# Patient Record
Sex: MALE | Race: WHITE | NOT HISPANIC OR LATINO | Employment: FULL TIME | URBAN - METROPOLITAN AREA
[De-identification: names, ages, dates, MRNs, and addresses within clinical notes are randomized per-mention and may not be internally consistent; named-entity substitution may affect disease eponyms.]

---

## 2019-08-09 ENCOUNTER — OFFICE VISIT (OUTPATIENT)
Dept: FAMILY MEDICINE CLINIC | Facility: CLINIC | Age: 29
End: 2019-08-09

## 2019-08-09 VITALS
WEIGHT: 130.4 LBS | HEIGHT: 71 IN | DIASTOLIC BLOOD PRESSURE: 86 MMHG | HEART RATE: 63 BPM | OXYGEN SATURATION: 99 % | BODY MASS INDEX: 18.26 KG/M2 | SYSTOLIC BLOOD PRESSURE: 118 MMHG

## 2019-08-09 DIAGNOSIS — G43.709 CHRONIC MIGRAINE WITHOUT AURA WITHOUT STATUS MIGRAINOSUS, NOT INTRACTABLE: ICD-10-CM

## 2019-08-09 DIAGNOSIS — D17.0 LIPOMA OF NECK: Primary | ICD-10-CM

## 2019-08-09 PROCEDURE — 99203 OFFICE O/P NEW LOW 30 MIN: CPT | Performed by: FAMILY MEDICINE

## 2019-08-09 RX ORDER — SUMATRIPTAN 25 MG/1
TABLET, FILM COATED ORAL
Qty: 10 TABLET | Refills: 0 | Status: SHIPPED | OUTPATIENT
Start: 2019-08-09 | End: 2020-02-06 | Stop reason: SDUPTHER

## 2019-08-14 NOTE — PROGRESS NOTES
Subjective   Brijesh Ferrera is a 28 y.o. male.     Chief Complaint   Patient presents with   • Establish Care     new pt,    • Abscess     behind left ear        History of Present Illness     Brijesh Ferrera presents today for   Chief Complaint   Patient presents with   • Establish Care     new pt,    • Abscess     behind left ear      He reports that he has what appears to be an abscess behind his left ear.  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    He has a history of chronic migraines, takes Advil as needed.  These occur a few times per month, when they do occur they are severely debilitating.  If he does not catch them early he is typically down for the day.    This patient is accompanied by their self who contributes to the history of their care.    The following portions of the patient's history were reviewed and updated as appropriate: allergies, current medications, past family history, past medical history, past social history, past surgical history and problem list.    Active Ambulatory Problems     Diagnosis Date Noted   • No Active Ambulatory Problems     Resolved Ambulatory Problems     Diagnosis Date Noted   • No Resolved Ambulatory Problems     Past Medical History:   Diagnosis Date   • Migraine      Past Surgical History:   Procedure Laterality Date   • CATARACT EXTRACTION WITH INTRAOCULAR LENS IMPLANT Right    • CRYOTHERAPY       Family History   Problem Relation Age of Onset   • Diabetes Father      Social History     Socioeconomic History   • Marital status:      Spouse name: Not on file   • Number of children: Not on file   • Years of education: Not on file   • Highest education level: Not on file   Tobacco Use   • Smoking status: Current Every Day Smoker     Types: Electronic Cigarette   • Smokeless tobacco: Never  "Used   Substance and Sexual Activity   • Alcohol use: Yes     Comment: rarely    • Drug use: Yes     Types: Marijuana   • Sexual activity: Yes     Partners: Female     Birth control/protection: None         Review of Systems   Constitutional: Negative.    Eyes: Negative.    Respiratory: Negative for cough, chest tightness, shortness of breath and wheezing.    Cardiovascular: Negative for chest pain and palpitations.   Gastrointestinal: Negative for abdominal distention, abdominal pain, constipation, diarrhea, nausea and vomiting.   Musculoskeletal: Negative for gait problem and joint swelling.   Skin: Negative for color change and wound.   Neurological: Positive for headaches.   Psychiatric/Behavioral: Negative for agitation and hallucinations.       Objective   Blood pressure 118/86, pulse 63, height 180.3 cm (71\"), weight 59.1 kg (130 lb 6.4 oz), SpO2 99 %.  Nursing note reviewed  Physical Exam  Const: NAD, A&Ox4, Pleasant, Cooperative  Eyes: EOMI, no conjunctivitis  ENT: No nasal discharge present, neck supple  Cardiac: Regular rate and rhythm, no cyanosis  Resp: Respiratory rate within normal limits, no increased work of breathing, no audible wheezing or retractions noted  GI: No distention or ascites  MSK: Motor and sensation grossly intact in bilateral upper extremities  Neurologic: CN II-XII grossly intact  Psych: Appropriate mood and behavior.  Skin: There is a large purple papule about 1.9 cm in diameter behind his left ear over the mastoid process  Procedures  Assessment/Plan     Problem List Items Addressed This Visit     None      Visit Diagnoses     Lipoma of neck    -  Primary    Ref to derm- left mastoid process    Relevant Orders    Ambulatory Referral to Dermatology    Chronic migraine without aura without status migrainosus, not intractable        Relevant Medications    SUMAtriptan (IMITREX) 25 MG tablet      #1 lesion of skin on left mastoid process  Unsure exactly what this may be, the " discoloration would suggest that it is not a lipoma.  It does not appear to have any umbilication or fluctuance more consistent with an abscess.  I would like him to see a dermatologist to have this biopsied    #2 chronic migraine  I like him to use Imitrex as needed at the onset of headache    There are no Patient Instructions on file for this visit.    No Follow-up on file.    Ambulatory progress note signed and attested to by Abundio Shanks D.O.

## 2020-02-06 ENCOUNTER — OFFICE VISIT (OUTPATIENT)
Dept: FAMILY MEDICINE CLINIC | Facility: CLINIC | Age: 30
End: 2020-02-06

## 2020-02-06 ENCOUNTER — LAB (OUTPATIENT)
Dept: LAB | Facility: HOSPITAL | Age: 30
End: 2020-02-06

## 2020-02-06 VITALS
OXYGEN SATURATION: 98 % | DIASTOLIC BLOOD PRESSURE: 70 MMHG | WEIGHT: 129 LBS | HEART RATE: 83 BPM | HEIGHT: 71 IN | BODY MASS INDEX: 18.06 KG/M2 | SYSTOLIC BLOOD PRESSURE: 120 MMHG

## 2020-02-06 DIAGNOSIS — G43.709 CHRONIC MIGRAINE WITHOUT AURA WITHOUT STATUS MIGRAINOSUS, NOT INTRACTABLE: ICD-10-CM

## 2020-02-06 DIAGNOSIS — R19.5 LOOSE STOOLS: ICD-10-CM

## 2020-02-06 DIAGNOSIS — R40.0 DAYTIME SOMNOLENCE: ICD-10-CM

## 2020-02-06 DIAGNOSIS — R51.9 HEADACHE CAUSING FREQUENT AWAKENING FROM SLEEP: ICD-10-CM

## 2020-02-06 DIAGNOSIS — R06.83 SNORING: ICD-10-CM

## 2020-02-06 DIAGNOSIS — G43.709 CHRONIC MIGRAINE WITHOUT AURA WITHOUT STATUS MIGRAINOSUS, NOT INTRACTABLE: Primary | ICD-10-CM

## 2020-02-06 DIAGNOSIS — R41.840 ATTENTION DEFICIT: ICD-10-CM

## 2020-02-06 DIAGNOSIS — Z01.00 ROUTINE EYE EXAM: ICD-10-CM

## 2020-02-06 LAB
ALBUMIN SERPL-MCNC: 4.6 G/DL (ref 3.5–5.2)
ALBUMIN/GLOB SERPL: 2 G/DL
ALP SERPL-CCNC: 98 U/L (ref 39–117)
ALT SERPL W P-5'-P-CCNC: 11 U/L (ref 1–41)
ANION GAP SERPL CALCULATED.3IONS-SCNC: 11 MMOL/L (ref 5–15)
AST SERPL-CCNC: 14 U/L (ref 1–40)
BASOPHILS # BLD AUTO: 0.1 10*3/MM3 (ref 0–0.2)
BASOPHILS NFR BLD AUTO: 1 % (ref 0–1.5)
BILIRUB SERPL-MCNC: 0.2 MG/DL (ref 0.2–1.2)
BUN BLD-MCNC: 12 MG/DL (ref 6–20)
BUN/CREAT SERPL: 12.9 (ref 7–25)
CALCIUM SPEC-SCNC: 9.4 MG/DL (ref 8.6–10.5)
CHLORIDE SERPL-SCNC: 101 MMOL/L (ref 98–107)
CO2 SERPL-SCNC: 26 MMOL/L (ref 22–29)
CREAT BLD-MCNC: 0.93 MG/DL (ref 0.76–1.27)
DEPRECATED RDW RBC AUTO: 40.3 FL (ref 37–54)
EOSINOPHIL # BLD AUTO: 0.76 10*3/MM3 (ref 0–0.4)
EOSINOPHIL NFR BLD AUTO: 7.7 % (ref 0.3–6.2)
ERYTHROCYTE [DISTWIDTH] IN BLOOD BY AUTOMATED COUNT: 12 % (ref 12.3–15.4)
ERYTHROCYTE [SEDIMENTATION RATE] IN BLOOD: 3 MM/HR (ref 0–15)
GFR SERPL CREATININE-BSD FRML MDRD: 96 ML/MIN/1.73
GLOBULIN UR ELPH-MCNC: 2.3 GM/DL
GLUCOSE BLD-MCNC: 95 MG/DL (ref 65–99)
HCT VFR BLD AUTO: 43.6 % (ref 37.5–51)
HGB BLD-MCNC: 15.1 G/DL (ref 13–17.7)
IMM GRANULOCYTES # BLD AUTO: 0.04 10*3/MM3 (ref 0–0.05)
IMM GRANULOCYTES NFR BLD AUTO: 0.4 % (ref 0–0.5)
LYMPHOCYTES # BLD AUTO: 2.14 10*3/MM3 (ref 0.7–3.1)
LYMPHOCYTES NFR BLD AUTO: 21.6 % (ref 19.6–45.3)
MCH RBC QN AUTO: 32.2 PG (ref 26.6–33)
MCHC RBC AUTO-ENTMCNC: 34.6 G/DL (ref 31.5–35.7)
MCV RBC AUTO: 93 FL (ref 79–97)
MONOCYTES # BLD AUTO: 0.85 10*3/MM3 (ref 0.1–0.9)
MONOCYTES NFR BLD AUTO: 8.6 % (ref 5–12)
NEUTROPHILS # BLD AUTO: 6.01 10*3/MM3 (ref 1.7–7)
NEUTROPHILS NFR BLD AUTO: 60.7 % (ref 42.7–76)
NRBC BLD AUTO-RTO: 0 /100 WBC (ref 0–0.2)
PLATELET # BLD AUTO: 327 10*3/MM3 (ref 140–450)
PMV BLD AUTO: 9.5 FL (ref 6–12)
POTASSIUM BLD-SCNC: 4.6 MMOL/L (ref 3.5–5.2)
PROT SERPL-MCNC: 6.9 G/DL (ref 6–8.5)
RBC # BLD AUTO: 4.69 10*6/MM3 (ref 4.14–5.8)
SODIUM BLD-SCNC: 138 MMOL/L (ref 136–145)
TSH SERPL DL<=0.05 MIU/L-ACNC: 1.05 UIU/ML (ref 0.27–4.2)
WBC NRBC COR # BLD: 9.9 10*3/MM3 (ref 3.4–10.8)

## 2020-02-06 PROCEDURE — 85652 RBC SED RATE AUTOMATED: CPT

## 2020-02-06 PROCEDURE — 84443 ASSAY THYROID STIM HORMONE: CPT

## 2020-02-06 PROCEDURE — 99215 OFFICE O/P EST HI 40 MIN: CPT | Performed by: FAMILY MEDICINE

## 2020-02-06 PROCEDURE — 80053 COMPREHEN METABOLIC PANEL: CPT

## 2020-02-06 PROCEDURE — 85025 COMPLETE CBC W/AUTO DIFF WBC: CPT

## 2020-02-06 RX ORDER — SUMATRIPTAN 25 MG/1
TABLET, FILM COATED ORAL
Qty: 20 TABLET | Refills: 0 | Status: SHIPPED | OUTPATIENT
Start: 2020-02-06

## 2020-02-06 RX ORDER — SUMATRIPTAN 25 MG/1
TABLET, FILM COATED ORAL
Qty: 20 TABLET | Refills: 0 | Status: SHIPPED | OUTPATIENT
Start: 2020-02-06 | End: 2020-02-06 | Stop reason: SDUPTHER

## 2020-02-06 NOTE — PROGRESS NOTES
"Subjective   Brijesh Ferrera is a 29 y.o. male.     Chief Complaint   Patient presents with   • Follow-up     migraines, loose stools ongoing for 6 months        History of Present Illness     Brijesh Ferrera presents today for   Chief Complaint   Patient presents with   • Follow-up     migraines, loose stools ongoing for 6 months      Loose stools.  Described as between a type V and VI on the [place] stool scale.  Denies any mucus or blood, denies oily stools or particularly foul-smelling stool.  He has some abdominal cramping but this is usually more in the mornings and not associated temporally with the diarrhea.  Duration- 6 months  Diet- eats out a lot, admits he does not follow a particularly healthy diet, he has always had fairly low weight and difficulty gaining weight, so he does not really pay much attention to \"healthy\" foods.  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  Family History- none  Pattern- daily  Timing- happens about 30 minutes after eating anything  Travel- none  Currently vape & marijuana (the latter was an attempt to try to increase his appetite)    He has a history of chronic migraines, takes Advil as needed.  These occur a few times per month, when they do occur they are severely debilitating.  If he does not catch them early he is typically down for the day.  These have been occurring for a number of years since his early 20s, they have been becoming more frequent.  He took 10 Imitrex over the course of maybe a month after he was seen last fall.  Since then he has just struggled with them.  They are becoming more of a disturbance, coming 3-4 times per week if not daily.  Most often they occur at night, awaken him from sleep.  He does snore often and loudly per his wife.  He has had daytime somnolence for as long as he can remember, struggled with ADHD type symptoms when he was younger but has just kind of dealt with them.    This patient is accompanied by their self who contributes to the history of their " care.    The following portions of the patient's history were reviewed and updated as appropriate: allergies, current medications, past family history, past medical history, past social history, past surgical history and problem list.    Active Ambulatory Problems     Diagnosis Date Noted   • No Active Ambulatory Problems     Resolved Ambulatory Problems     Diagnosis Date Noted   • No Resolved Ambulatory Problems     Past Medical History:   Diagnosis Date   • Migraine      Past Surgical History:   Procedure Laterality Date   • CATARACT EXTRACTION WITH INTRAOCULAR LENS IMPLANT Right    • CRYOTHERAPY       Family History   Problem Relation Age of Onset   • Diabetes Father      Social History     Socioeconomic History   • Marital status:      Spouse name: Not on file   • Number of children: Not on file   • Years of education: Not on file   • Highest education level: Not on file   Tobacco Use   • Smoking status: Current Every Day Smoker     Types: Electronic Cigarette   • Smokeless tobacco: Never Used   Substance and Sexual Activity   • Alcohol use: Yes     Comment: rarely    • Drug use: Yes     Types: Marijuana   • Sexual activity: Yes     Partners: Female     Birth control/protection: None         Review of Systems   Constitutional: Negative.    Eyes: Negative.    Respiratory: Negative for cough, chest tightness, shortness of breath and wheezing.    Cardiovascular: Negative for chest pain and palpitations.   Gastrointestinal: Positive for abdominal pain, diarrhea and nausea. Negative for abdominal distention, blood in stool, constipation and vomiting.   Endocrine: Negative.    Genitourinary: Negative.    Musculoskeletal: Negative for gait problem and joint swelling.   Skin: Negative for color change and wound.   Neurological: Positive for headaches.   Hematological: Negative.    Psychiatric/Behavioral: Negative for agitation and hallucinations.       Objective   Blood pressure 120/70, pulse 83, height 180.3 cm  "(70.98\"), weight 58.5 kg (129 lb), SpO2 98 %.  Nursing note reviewed  Physical Exam  Const: NAD, A&Ox4, Pleasant, Cooperative  Eyes: EOMI, no conjunctivitis  ENT: No nasal discharge present, neck supple  Cardiac: Regular rate and rhythm, no cyanosis  Resp: Respiratory rate within normal limits, no increased work of breathing, no audible wheezing or retractions noted  GI: No distention or ascites, BS + normoactive  MSK: Motor and sensation grossly intact in bilateral upper extremities  Neurologic examination:  Mental status:  The patient is alert, attentive, and oriented. Speech is clear and fluent with good repetition, comprehension, and naming. She recalls 3/3 objects at 5 minutes.    Cranial nerves:  CN II: Visual fields are full to confrontation. Fundoscopic exam is normal with sharp discs and no vascular changes. Pupils are 4 mm and briskly reactive to light. Visual acuity is 20/20 bilaterally.    CN III, IV, VI: At primary gaze, there is no eye deviation.    CN V: Facial sensation is intact to pinprick in all 3 divisions bilaterally. Corneal responses are intact.    CN VII: Face is symmetric with normal eye closure and smile.    CN VII: Hearing is normal to rubbing fingers    CN IX, X: Palate elevates symmetrically. Phonation is normal.    CN XI: Head turning and shoulder shrug are intact    CN XII: Tongue is midline with normal movements and no atrophy.    Procedures  Assessment/Plan     Problem List Items Addressed This Visit     None      Visit Diagnoses     Chronic migraine without aura without status migrainosus, not intractable    -  Primary    Relevant Medications    SUMAtriptan (IMITREX) 25 MG tablet    Other Relevant Orders    MRI Brain Without Contrast    Home Sleep Study    Loose stools        Relevant Orders    TSH Rfx On Abnormal To Free T4    Comprehensive Metabolic Panel    CBC & Differential    Sedimentation rate, automated    Occult Blood, Fecal By Immunoassay - Stool, Per Rectum    Headache " causing frequent awakening from sleep        Relevant Orders    MRI Brain Without Contrast    Home Sleep Study    Snoring        Relevant Orders    Home Sleep Study    Daytime somnolence        Relevant Orders    Home Sleep Study    Attention deficit        Relevant Orders    Home Sleep Study    Routine eye exam        Relevant Orders    Ambulatory Referral to Ophthalmology        #1 loose stools  Present for >6 months daily 3-4 times per day.  We will check stool studies, most likely appears to be a functional/IBS picture versus inflammatory bowel disease.  He has had low weight for a number of years and difficulty gaining, which would be consistent with inflammatory bowel disease.  He has been a long-term smoker which typically would be relatively protective.  He denies mucus or blood in the stool overtly.  He denies constitutional symptoms.  -Pending blood work, I like him to keep a food diary and work on dietary modification as an initial step    #2 chronic migraine  I'd like him to use Imitrex as needed at the onset of headache  There is significant concern with the headaches becoming more frequent and being severe enough to actually awaken him from sleep.  As far as I tell he has never had an MRI completed, this is important for him to have over the next week.  -Also of concern is sleep apnea given his constellation of symptoms including the nighttime/morning headaches, daytime somnolence, severe snoring, and attention deficit.  I have ordered a home sleep study to be completed.  -If these are negative, he may be a candidate for daily medication such as amitriptyline or other suppressive medication such as Aimovig    Patient Instructions   1.  Try to keep a food/symptom diary    2.  Also note headache occurence      Return in about 2 weeks (around 2/20/2020) for follow up after testing/imaging.    Ambulatory progress note signed and attested to by Abundio Shanks D.O.

## 2020-02-11 ENCOUNTER — APPOINTMENT (OUTPATIENT)
Dept: LAB | Facility: HOSPITAL | Age: 30
End: 2020-02-11

## 2020-02-19 ENCOUNTER — HOSPITAL ENCOUNTER (OUTPATIENT)
Dept: MRI IMAGING | Facility: HOSPITAL | Age: 30
Discharge: HOME | End: 2020-02-19
Admitting: FAMILY MEDICINE

## 2020-02-19 DIAGNOSIS — G43.709 CHRONIC MIGRAINE WITHOUT AURA WITHOUT STATUS MIGRAINOSUS, NOT INTRACTABLE: ICD-10-CM

## 2020-02-19 DIAGNOSIS — R51.9 HEADACHE CAUSING FREQUENT AWAKENING FROM SLEEP: ICD-10-CM

## 2020-02-19 PROCEDURE — 70551 MRI BRAIN STEM W/O DYE: CPT

## 2020-02-24 ENCOUNTER — HOSPITAL ENCOUNTER (OUTPATIENT)
Dept: SLEEP MEDICINE | Facility: HOSPITAL | Age: 30
Discharge: HOME | End: 2020-02-24
Admitting: FAMILY MEDICINE

## 2020-02-24 VITALS
BODY MASS INDEX: 18 KG/M2 | WEIGHT: 128.6 LBS | HEIGHT: 71 IN | DIASTOLIC BLOOD PRESSURE: 72 MMHG | RESPIRATION RATE: 16 BRPM | SYSTOLIC BLOOD PRESSURE: 117 MMHG | HEART RATE: 68 BPM | OXYGEN SATURATION: 98 %

## 2020-02-24 DIAGNOSIS — R41.840 ATTENTION DEFICIT: ICD-10-CM

## 2020-02-24 DIAGNOSIS — R06.83 SNORING: ICD-10-CM

## 2020-02-24 DIAGNOSIS — R51.9 HEADACHE CAUSING FREQUENT AWAKENING FROM SLEEP: ICD-10-CM

## 2020-02-24 DIAGNOSIS — G43.709 CHRONIC MIGRAINE WITHOUT AURA WITHOUT STATUS MIGRAINOSUS, NOT INTRACTABLE: ICD-10-CM

## 2020-02-24 DIAGNOSIS — R40.0 DAYTIME SOMNOLENCE: ICD-10-CM

## 2020-02-24 PROCEDURE — 95800 SLP STDY UNATTENDED: CPT

## 2020-02-24 PROCEDURE — 95800 SLP STDY UNATTENDED: CPT | Performed by: INTERNAL MEDICINE

## 2020-02-27 ENCOUNTER — OFFICE VISIT (OUTPATIENT)
Dept: FAMILY MEDICINE CLINIC | Facility: CLINIC | Age: 30
End: 2020-02-27

## 2020-02-27 VITALS
OXYGEN SATURATION: 99 % | SYSTOLIC BLOOD PRESSURE: 100 MMHG | BODY MASS INDEX: 18.2 KG/M2 | HEART RATE: 71 BPM | DIASTOLIC BLOOD PRESSURE: 70 MMHG | WEIGHT: 130 LBS | HEIGHT: 71 IN

## 2020-02-27 DIAGNOSIS — G43.709 CHRONIC MIGRAINE WITHOUT AURA WITHOUT STATUS MIGRAINOSUS, NOT INTRACTABLE: Primary | ICD-10-CM

## 2020-02-27 DIAGNOSIS — J32.4 CHRONIC PANSINUSITIS: ICD-10-CM

## 2020-02-27 DIAGNOSIS — R19.5 LOOSE STOOLS: ICD-10-CM

## 2020-02-27 DIAGNOSIS — G47.30 MILD SLEEP APNEA: ICD-10-CM

## 2020-02-27 PROCEDURE — 99395 PREV VISIT EST AGE 18-39: CPT | Performed by: FAMILY MEDICINE

## 2020-02-27 NOTE — PROGRESS NOTES
"Subjective   Brijesh Ferrera Jr. is a 29 y.o. male.     Chief Complaint   Patient presents with   • Annual Exam     physical        History of Present Illness     Brijesh Ferrera Jr. presents today for   Chief Complaint   Patient presents with   • Annual Exam     physical      Loose stools.  Described as between a type V and VI on the [place] stool scale.  Denies any mucus or blood, denies oily stools or particularly foul-smelling stool.  He has some abdominal cramping but this is usually more in the mornings and not associated temporally with the diarrhea.  Duration- 6 months  Diet- eats out a lot, admits he does not follow a particularly healthy diet, he has always had fairly low weight and difficulty gaining weight, so he does not really pay much attention to \"healthy\" foods.  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  Family History- none  Pattern- daily  Timing- happens about 30 minutes after eating anything  Travel- none  Currently vape & marijuana (the latter was an attempt to try to increase his appetite)    He has a history of chronic migraines, takes Advil as needed.  These occur a few times per month, when they do occur they are severely debilitating.  If he does not catch them early he is typically down for the day.  These have been occurring for a number of years since his early 20s, they have been becoming more frequent.  He took 10 Imitrex over the course of maybe a month after he was seen last fall.  Since then he has just struggled with them.  They are becoming more of a disturbance, coming 3-4 times per week if not daily.  Most often they occur at night, awaken him from sleep.  He does snore often and loudly per his wife.  He has had daytime somnolence for as long as he can remember, struggled with ADHD type symptoms when he was younger but has just kind of dealt with them.    This patient is accompanied by their self who contributes to the history of their care.    The following portions of the patient's " history were reviewed and updated as appropriate: allergies, current medications, past family history, past medical history, past social history, past surgical history and problem list.    Active Ambulatory Problems     Diagnosis Date Noted   • Chronic pansinusitis 02/27/2020   • Chronic migraine without aura without status migrainosus, not intractable 02/27/2020   • Mild sleep apnea 02/27/2020   • Loose stools 02/27/2020     Resolved Ambulatory Problems     Diagnosis Date Noted   • No Resolved Ambulatory Problems     Past Medical History:   Diagnosis Date   • Migraine      Past Surgical History:   Procedure Laterality Date   • CATARACT EXTRACTION WITH INTRAOCULAR LENS IMPLANT Right    • CRYOTHERAPY       Family History   Problem Relation Age of Onset   • Diabetes Father      Social History     Socioeconomic History   • Marital status:      Spouse name: Not on file   • Number of children: Not on file   • Years of education: Not on file   • Highest education level: Not on file   Tobacco Use   • Smoking status: Current Every Day Smoker     Types: Electronic Cigarette   • Smokeless tobacco: Never Used   Substance and Sexual Activity   • Alcohol use: Yes     Comment: rarely    • Drug use: Yes     Types: Marijuana   • Sexual activity: Yes     Partners: Female     Birth control/protection: None         Review of Systems   Constitutional: Negative.    HENT: Positive for congestion.    Eyes: Negative.    Respiratory: Negative for cough, chest tightness, shortness of breath and wheezing.    Cardiovascular: Negative for chest pain and palpitations.   Gastrointestinal: Positive for diarrhea. Negative for abdominal distention, blood in stool, constipation and vomiting.   Endocrine: Negative.    Genitourinary: Negative.    Musculoskeletal: Negative for gait problem and joint swelling.   Skin: Negative for color change and wound.   Neurological: Positive for headaches.   Hematological: Negative.    Psychiatric/Behavioral:  "Negative for agitation and hallucinations.       Objective   Blood pressure 100/70, pulse 71, height 180.3 cm (70.98\"), weight 59 kg (130 lb), SpO2 99 %.  Nursing note reviewed  Physical Exam  Const: NAD, A&Ox4, Pleasant, Cooperative  Eyes: EOMI, no conjunctivitis  ENT: No nasal discharge present, neck supple  Cardiac: Regular rate and rhythm, no cyanosis  Resp: Respiratory rate within normal limits, no increased work of breathing, no audible wheezing or retractions noted  GI: No distention or ascites, BS + normoactive    Procedures  Assessment/Plan     Problem List Items Addressed This Visit        Cardiovascular and Mediastinum    Chronic migraine without aura without status migrainosus, not intractable - Primary    Overview     Likely contributed to by mild sleep apnea secondary to sinusitis as well as sinusitis itself. Recommended treatment for that, ENT referral. Continue sumatriptan as needed. If not improved, consider daily prophylactic medication such as amitriptyline or other suppressive medication such as Aimovig.            Respiratory    Chronic pansinusitis    Overview     By MRI 2/2020. Start montelukast 10mg plus fluticasone daily. Referral to ENT placed today.         Relevant Medications    montelukast (Singulair) 10 MG tablet    fluticasone (Flonase) 50 MCG/ACT nasal spray    Other Relevant Orders    Ambulatory Referral to ENT (Otolaryngology)    Mild sleep apnea    Overview     By home sleep study 2/2020/ Recommended medications for sinusitis, follow up with ENT. Unlikely to need CPAP but a consideration if not improved.            Other    Loose stools    Overview     Stool studies unremarkable, labs unremarkable. Likely diet-related, recommended he increase the natural fiber in his diet, reduce processed foods and red meat, and monitor.             The preventative exam has been reviewed in detail.  The patient has been fully counseled on preventative guidelines for vaccines, cancer screenings, " and other health maintenance needs. The patient was counseled on maintaining a lifestyle to promote good health and to minimize chronic diseases.  The patient has been assisted with scheduling healthcare procedures for the coming year and given a written document outlining these recommendations. Age-appropriate screening measures have been ordered for the patient today as indicated above.      There are no Patient Instructions on file for this visit.    No follow-ups on file.    Ambulatory progress note signed and attested to by Abundio Shanks D.O.

## 2020-05-28 ENCOUNTER — TELEPHONE (OUTPATIENT)
Dept: FAMILY MEDICINE CLINIC | Facility: CLINIC | Age: 30
End: 2020-05-28

## 2020-06-02 RX ORDER — FLUTICASONE PROPIONATE 50 MCG
1 SPRAY, SUSPENSION (ML) NASAL DAILY
Qty: 9.9 ML | Refills: 11 | Status: SHIPPED | OUTPATIENT
Start: 2020-06-02

## 2020-06-02 RX ORDER — MONTELUKAST SODIUM 10 MG/1
10 TABLET ORAL NIGHTLY
Qty: 30 TABLET | Refills: 2 | Status: SHIPPED | OUTPATIENT
Start: 2020-06-02

## 2020-06-02 NOTE — TELEPHONE ENCOUNTER
See 2/27/2020 office note.     Instructed to start Singulair 10 mg plus Flonase daily. Neither was sent to pharmacy. Also recommended ENT evaluation. Referral not placed.

## 2025-01-22 ENCOUNTER — OFFICE VISIT (OUTPATIENT)
Dept: FAMILY MEDICINE CLINIC | Facility: CLINIC | Age: 35
End: 2025-01-22
Payer: COMMERCIAL

## 2025-01-22 VITALS
HEART RATE: 82 BPM | WEIGHT: 129 LBS | DIASTOLIC BLOOD PRESSURE: 76 MMHG | OXYGEN SATURATION: 98 % | HEIGHT: 71 IN | BODY MASS INDEX: 18.06 KG/M2 | SYSTOLIC BLOOD PRESSURE: 116 MMHG

## 2025-01-22 DIAGNOSIS — G43.709 CHRONIC MIGRAINE WITHOUT AURA WITHOUT STATUS MIGRAINOSUS, NOT INTRACTABLE: ICD-10-CM

## 2025-01-22 DIAGNOSIS — Z00.00 PREVENTATIVE HEALTH CARE: Primary | ICD-10-CM

## 2025-01-22 RX ORDER — CETIRIZINE HYDROCHLORIDE 10 MG/1
10 TABLET ORAL DAILY
COMMUNITY

## 2025-01-22 RX ORDER — SUMATRIPTAN SUCCINATE 25 MG/1
TABLET ORAL
Qty: 20 TABLET | Refills: 0 | Status: SHIPPED | OUTPATIENT
Start: 2025-01-22

## 2025-01-22 NOTE — PROGRESS NOTES
Annual Well Adult Visit     Patient Name: Brijesh Ferrera Jr.  : 1990   MRN: [id number]   Care Team: Patient Care Team:  Abundio Shanks DO as PCP - General (Family Medicine)    Chief Complaint:    Chief Complaint   Patient presents with    Establish Care     Wanted a general check up since it has been 5 years without seeing a PCP    Skin tags     On the neck       HPI    History of Present Illness: Brijesh Ferrera Jr. is a 34 y.o. male who presents today for annual physical exam and preventative care. 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 He is not fasting today. Generally poor diet, mostly fast food at lunch. Not exercising. Working 11 hours per day, trouble with sleep initiation. Usually not getting home until 8, eats at 8:30, usually not in bed until 11:30 and asleep by 1. Caffeine intake is pretty high.    Looking into new job within sales.    Recent Hospitalizations:  He was not admitted to the hospital during the last year.     The following portions of the patient's history were reviewed and updated as appropriate: allergies, current medications, past family history, past medical history, past social history, past surgical history and problem list.      Health Maintenance Summary            Overdue - BMI FOLLOWUP (Yearly) Never done      No completion, postpone, or frequency change history exists for this topic.              Overdue - Pneumococcal Vaccine 0-64 (1 of 2 - PCV) Never done      No completion, postpone, or frequency change history exists for this topic.              Overdue - TDAP/TD VACCINES (1 - Tdap) Never done      No completion, postpone, or frequency change history exists for this topic.              Overdue - HEPATITIS C SCREENING (Once) Never done      No completion, postpone, or frequency change history exists for this topic.              Overdue - ANNUAL PHYSICAL (Yearly) Overdue since 2020  Registry Metric:  Last Annual Physical              Overdue - COVID-19 Vaccine (1 - 2024-25 season) Never done      No completion, postpone, or frequency change history exists for this topic.              Postponed - INFLUENZA VACCINE (Yearly - July to March) Postponed until 3/31/2025      02/03/2025  Postponed until 3/31/2025 by Brittany Bearden MA (Patient Refused)                     Subjective      Review of Systems:   Review of Systems - See HPI    Past Medical History:   Past Medical History:   Diagnosis Date    Migraine        Past Surgical History:   Past Surgical History:   Procedure Laterality Date    CATARACT EXTRACTION WITH INTRAOCULAR LENS IMPLANT Right     Traumatic    CRYOTHERAPY  2008    Retinal    EYE SURGERY  2008       Family History:   Family History   Problem Relation Age of Onset    Diabetes Father     Dementia Paternal Grandfather        Social History:   Social History     Socioeconomic History    Marital status:    Tobacco Use    Smoking status: Every Day     Types: Electronic Cigarette    Smokeless tobacco: Never   Vaping Use    Vaping status: Every Day    Substances: Nicotine, Flavoring    Devices: Disposable, Pre-filled pod   Substance and Sexual Activity    Alcohol use: Yes     Comment: Very rarely    Drug use: Yes     Types: Marijuana    Sexual activity: Yes     Partners: Female     Birth control/protection: None       Social History     Social History Narrative    Not on file        Tobacco History:   Social History     Tobacco Use   Smoking Status Every Day    Types: Electronic Cigarette   Smokeless Tobacco Never       Medications:     Current Outpatient Medications:     SUMAtriptan (Imitrex) 25 MG tablet, Take one tablet at onset of headache. May repeat dose one time in 2 hours if headache not relieved., Disp: 20 tablet, Rfl: 0    cetirizine (zyrTEC) 10 MG tablet, Take 1 tablet by mouth Daily., Disp: , Rfl:     Immunizations:     There is no immunization history on file for this patient.  "    Allergies:   No Known Allergies    Objective   Objective     Physical Exam:  Vital Signs:   Vitals:    01/22/25 1015   BP: 116/76   Pulse: 82   SpO2: 98%   Weight: 58.5 kg (129 lb)   Height: 180.3 cm (70.98\")     Body mass index is 18 kg/m².   Facility age limit for growth %randy is 20 years.   Physical Exam  Nursing note reviewed  Const: NAD, Pleasant, Cooperative  Eyes: EOMI, no conjunctivitis  ENT: No nasal discharge present, neck supple  Cardiac: Regular rate and rhythm, no cyanosis  PHQ-2 Depression Screening  Little interest or pleasure in doing things? Several days   Feeling down, depressed, or hopeless? Several days   PHQ-2 Total Score 2      Procedures/Radiology     Procedures  No radiology results for the last 7 days         Assessment & Plan   Assessment / Plan      Assessment/Plan:   Problems Addressed This Visit  Diagnoses and all orders for this visit:    1. Preventative health care (Primary)    2. Chronic migraine without aura without status migrainosus, not intractable  -     SUMAtriptan (Imitrex) 25 MG tablet; Take one tablet at onset of headache. May repeat dose one time in 2 hours if headache not relieved.  Dispense: 20 tablet; Refill: 0      Problem List Items Addressed This Visit          Neuro    Chronic migraine without aura without status migrainosus, not intractable    Overview     Likely contributed to by mild sleep apnea secondary to sinusitis as well as sinusitis itself. Recommended treatment for that, ENT referral. Continue sumatriptan as needed. If not improved, consider daily prophylactic medication such as amitriptyline or other suppressive medication such as Aimovig.         Relevant Medications    SUMAtriptan (Imitrex) 25 MG tablet     Other Visit Diagnoses       Preventative health care    -  Primary            See patient diagnoses and orders along with patient instructions for assessment, plan, and changes to care for patient.    The preventative exam has been reviewed in " detail.  The patient has been fully counseled on preventative guidelines for vaccines, cancer screenings, and other health maintenance needs. The patient was counseled on maintaining a lifestyle to promote good health and to minimize chronic diseases.  The patient has been assisted with scheduling healthcare procedures for the coming year and given a written document outlining these recommendations. Age-appropriate screening measures have been ordered for the patient today as indicated above.    Patient Instructions   Make follow up with dentist and eye doctor    Follow Up:   Return in about 1 year (around 1/22/2026) for Annual.    DO DUC Iglesias RD  [facility]  [address] [facility] [address]  Fax [phone]  Phone [phone]    Disclaimer to patients: The 21st Century Cares Act makes medical notes like these available to patients in the interest of transparency. However, please be advised that this is still a medical document. It is intended as [id number] communication. Many sections may include medical language or jargon, abbreviations, and additional verbiage that are unfamiliar or confusing. In some ways it may come across as blunt, direct, or may be summarized in order to clearly and concisely communicate the most crucial information to medical professionals. It may also include mentions of conditions that are unlikely but considered as part of the differential diagnosis, including serious disorders. These are not always discussed at length at the time of appointment because their likelihood is so low, but may be included in a medical note to make it clear what has been considered and/or ruled out as part of a work-up. Medical documents are intended to carry relevant information, facts as evident, and the personal clinical opinion of the physician. If you have any questions regarding this medical document, please bring them to the  attention of the physician at your next scheduled appointment.

## 2025-01-23 ENCOUNTER — PATIENT ROUNDING (BHMG ONLY) (OUTPATIENT)
Dept: FAMILY MEDICINE CLINIC | Facility: CLINIC | Age: 35
End: 2025-01-23
Payer: COMMERCIAL

## 2025-02-03 ENCOUNTER — OFFICE VISIT (OUTPATIENT)
Dept: FAMILY MEDICINE CLINIC | Facility: CLINIC | Age: 35
End: 2025-02-03
Payer: COMMERCIAL

## 2025-02-03 VITALS
WEIGHT: 129.6 LBS | OXYGEN SATURATION: 98 % | DIASTOLIC BLOOD PRESSURE: 74 MMHG | BODY MASS INDEX: 18.15 KG/M2 | HEIGHT: 71 IN | SYSTOLIC BLOOD PRESSURE: 122 MMHG | HEART RATE: 85 BPM

## 2025-02-03 DIAGNOSIS — J06.9 UPPER RESPIRATORY TRACT INFECTION, UNSPECIFIED TYPE: Primary | ICD-10-CM

## 2025-02-03 DIAGNOSIS — R05.9 COUGH, UNSPECIFIED TYPE: ICD-10-CM

## 2025-02-03 LAB
EXPIRATION DATE: NORMAL
EXPIRATION DATE: NORMAL
FLUAV AG UPPER RESP QL IA.RAPID: NOT DETECTED
FLUBV AG UPPER RESP QL IA.RAPID: NOT DETECTED
INTERNAL CONTROL: NORMAL
INTERNAL CONTROL: NORMAL
Lab: NORMAL
Lab: NORMAL
S PYO AG THROAT QL: NEGATIVE
SARS-COV-2 AG UPPER RESP QL IA.RAPID: NOT DETECTED

## 2025-02-03 PROCEDURE — 87428 SARSCOV & INF VIR A&B AG IA: CPT | Performed by: STUDENT IN AN ORGANIZED HEALTH CARE EDUCATION/TRAINING PROGRAM

## 2025-02-03 PROCEDURE — 99213 OFFICE O/P EST LOW 20 MIN: CPT | Performed by: STUDENT IN AN ORGANIZED HEALTH CARE EDUCATION/TRAINING PROGRAM

## 2025-02-03 PROCEDURE — 87880 STREP A ASSAY W/OPTIC: CPT | Performed by: STUDENT IN AN ORGANIZED HEALTH CARE EDUCATION/TRAINING PROGRAM

## 2025-02-03 NOTE — PROGRESS NOTES
Chief Complaint   Patient presents with    Cough    Sore Throat       HPI:  Brijesh Ferrera Jr. is a 34 y.o. male chronic sinusitis, migraines, MEIR who presents today for above chief complaint.     Patient's symptoms started 2 days ago (Saturday) with coughing, sore throat, chills.  Felt slightly worse yesterday.  No recorded fever, was at 97 yesterday. Has been taking dayquil and nyquil which do seem to be helping. Used albuterol inhaler a couple of times because he felt himself wheezing.  Has had some sick contacts at work.  Denies any shortness of breath or chest pain.    PE:  Vitals:    02/03/25 0846   BP: 122/74   Pulse: 85   SpO2: 98%      Body mass index is 18.09 kg/m².    Gen Appearance: NAD  HEENT: Normocephalic, EOMI, no thyromegaly, trachea midline, TMs are normal-appearing without effusion or erythema bilaterally  Heart: RRR, normal S1 and S2, no murmur  Lungs: CTA b/l, no wheezing, no crackles  MSK: Moves all extremities well, normal gait, no peripheral edema  Neuro: No focal deficits    Current Outpatient Medications   Medication Sig Dispense Refill    cetirizine (zyrTEC) 10 MG tablet Take 1 tablet by mouth Daily.      SUMAtriptan (Imitrex) 25 MG tablet Take one tablet at onset of headache. May repeat dose one time in 2 hours if headache not relieved. 20 tablet 0     No current facility-administered medications for this visit.        A/P:  Diagnoses and all orders for this visit:    1. Upper respiratory tract infection, unspecified type (Primary)    2. Cough, unspecified type  -     POCT SARS-CoV-2 Antigen JONG + Flu  -     POCT rapid strep A    POC testing for COVID, flu and strep negative  Given symptom duration of 3 days suspect viral etiology  Recommend conservative tx- OTC decongestants, cough/cold medications  Could consider steroid burst to help with symptom relief but patient declines for now.  If symptoms fail to improve over next 3-4 days pt may message, would consider abx at that time. No  appt needed.    Return if symptoms worsen or fail to improve.     Dictated Utilizing Dragon Dictation    Please note that portions of this note were completed with a voice recognition program.    Part of this note may be an electronic transcription/translation of spoken language to printed text using the Dragon Dictation System.